# Patient Record
Sex: MALE | Race: WHITE | NOT HISPANIC OR LATINO | Employment: FULL TIME | URBAN - METROPOLITAN AREA
[De-identification: names, ages, dates, MRNs, and addresses within clinical notes are randomized per-mention and may not be internally consistent; named-entity substitution may affect disease eponyms.]

---

## 2022-03-27 ENCOUNTER — APPOINTMENT (OUTPATIENT)
Dept: CT IMAGING | Facility: CLINIC | Age: 37
End: 2022-03-27
Attending: EMERGENCY MEDICINE
Payer: COMMERCIAL

## 2022-03-27 ENCOUNTER — APPOINTMENT (OUTPATIENT)
Dept: ULTRASOUND IMAGING | Facility: CLINIC | Age: 37
End: 2022-03-27
Attending: EMERGENCY MEDICINE
Payer: COMMERCIAL

## 2022-03-27 ENCOUNTER — HOSPITAL ENCOUNTER (EMERGENCY)
Facility: CLINIC | Age: 37
Discharge: HOME | End: 2022-03-27
Attending: EMERGENCY MEDICINE | Admitting: EMERGENCY MEDICINE
Payer: COMMERCIAL

## 2022-03-27 VITALS
HEART RATE: 91 BPM | SYSTOLIC BLOOD PRESSURE: 126 MMHG | TEMPERATURE: 98.2 F | HEIGHT: 73 IN | RESPIRATION RATE: 18 BRPM | BODY MASS INDEX: 25.18 KG/M2 | OXYGEN SATURATION: 97 % | WEIGHT: 190 LBS | DIASTOLIC BLOOD PRESSURE: 75 MMHG

## 2022-03-27 DIAGNOSIS — R11.2 NON-INTRACTABLE VOMITING WITH NAUSEA, UNSPECIFIED VOMITING TYPE: ICD-10-CM

## 2022-03-27 DIAGNOSIS — R10.11 RUQ ABDOMINAL PAIN: ICD-10-CM

## 2022-03-27 LAB
ALBUMIN SERPL-MCNC: 4.4 G/DL (ref 3.4–5)
ALP SERPL-CCNC: 79 U/L (ref 40–150)
ALT SERPL W P-5'-P-CCNC: 53 U/L (ref 0–70)
ANION GAP SERPL CALCULATED.3IONS-SCNC: 6 MMOL/L (ref 3–14)
AST SERPL W P-5'-P-CCNC: 33 U/L (ref 0–45)
BASOPHILS # BLD AUTO: 0 10E3/UL (ref 0–0.2)
BASOPHILS NFR BLD AUTO: 0 %
BILIRUB SERPL-MCNC: 1.3 MG/DL (ref 0.2–1.3)
BUN SERPL-MCNC: 22 MG/DL (ref 7–30)
CALCIUM SERPL-MCNC: 9.1 MG/DL (ref 8.5–10.1)
CHLORIDE BLD-SCNC: 105 MMOL/L (ref 94–109)
CO2 SERPL-SCNC: 28 MMOL/L (ref 20–32)
CREAT SERPL-MCNC: 1.08 MG/DL (ref 0.66–1.25)
EOSINOPHIL # BLD AUTO: 0 10E3/UL (ref 0–0.7)
EOSINOPHIL NFR BLD AUTO: 0 %
ERYTHROCYTE [DISTWIDTH] IN BLOOD BY AUTOMATED COUNT: 11.8 % (ref 10–15)
GFR SERPL CREATININE-BSD FRML MDRD: >90 ML/MIN/1.73M2
GLUCOSE BLD-MCNC: 132 MG/DL (ref 70–99)
HCT VFR BLD AUTO: 46.9 % (ref 40–53)
HGB BLD-MCNC: 16.7 G/DL (ref 13.3–17.7)
IMM GRANULOCYTES # BLD: 0.1 10E3/UL
IMM GRANULOCYTES NFR BLD: 1 %
LIPASE SERPL-CCNC: 93 U/L (ref 73–393)
LYMPHOCYTES # BLD AUTO: 0.5 10E3/UL (ref 0.8–5.3)
LYMPHOCYTES NFR BLD AUTO: 4 %
MCH RBC QN AUTO: 31.6 PG (ref 26.5–33)
MCHC RBC AUTO-ENTMCNC: 35.6 G/DL (ref 31.5–36.5)
MCV RBC AUTO: 89 FL (ref 78–100)
MONOCYTES # BLD AUTO: 0.6 10E3/UL (ref 0–1.3)
MONOCYTES NFR BLD AUTO: 5 %
NEUTROPHILS # BLD AUTO: 10.5 10E3/UL (ref 1.6–8.3)
NEUTROPHILS NFR BLD AUTO: 90 %
NRBC # BLD AUTO: 0 10E3/UL
NRBC BLD AUTO-RTO: 0 /100
PLATELET # BLD AUTO: 166 10E3/UL (ref 150–450)
POTASSIUM BLD-SCNC: 3.4 MMOL/L (ref 3.4–5.3)
PROT SERPL-MCNC: 7.8 G/DL (ref 6.8–8.8)
RBC # BLD AUTO: 5.29 10E6/UL (ref 4.4–5.9)
SODIUM SERPL-SCNC: 139 MMOL/L (ref 133–144)
WBC # BLD AUTO: 11.7 10E3/UL (ref 4–11)

## 2022-03-27 PROCEDURE — 85025 COMPLETE CBC W/AUTO DIFF WBC: CPT | Performed by: EMERGENCY MEDICINE

## 2022-03-27 PROCEDURE — 250N000009 HC RX 250: Performed by: EMERGENCY MEDICINE

## 2022-03-27 PROCEDURE — 76705 ECHO EXAM OF ABDOMEN: CPT

## 2022-03-27 PROCEDURE — 96376 TX/PRO/DX INJ SAME DRUG ADON: CPT

## 2022-03-27 PROCEDURE — 99285 EMERGENCY DEPT VISIT HI MDM: CPT | Mod: 25

## 2022-03-27 PROCEDURE — 80053 COMPREHEN METABOLIC PANEL: CPT | Performed by: EMERGENCY MEDICINE

## 2022-03-27 PROCEDURE — 96375 TX/PRO/DX INJ NEW DRUG ADDON: CPT

## 2022-03-27 PROCEDURE — 96374 THER/PROPH/DIAG INJ IV PUSH: CPT | Mod: 59

## 2022-03-27 PROCEDURE — 83690 ASSAY OF LIPASE: CPT | Performed by: EMERGENCY MEDICINE

## 2022-03-27 PROCEDURE — 36415 COLL VENOUS BLD VENIPUNCTURE: CPT | Performed by: EMERGENCY MEDICINE

## 2022-03-27 PROCEDURE — 74177 CT ABD & PELVIS W/CONTRAST: CPT

## 2022-03-27 PROCEDURE — 250N000011 HC RX IP 250 OP 636: Performed by: EMERGENCY MEDICINE

## 2022-03-27 RX ORDER — TADALAFIL 5 MG/1
TABLET ORAL
COMMUNITY
Start: 2021-07-08

## 2022-03-27 RX ORDER — MORPHINE SULFATE 4 MG/ML
4 INJECTION, SOLUTION INTRAMUSCULAR; INTRAVENOUS
Status: COMPLETED | OUTPATIENT
Start: 2022-03-27 | End: 2022-03-27

## 2022-03-27 RX ORDER — IOPAMIDOL 755 MG/ML
95 INJECTION, SOLUTION INTRAVASCULAR ONCE
Status: COMPLETED | OUTPATIENT
Start: 2022-03-27 | End: 2022-03-27

## 2022-03-27 RX ORDER — ONDANSETRON 2 MG/ML
4 INJECTION INTRAMUSCULAR; INTRAVENOUS ONCE
Status: COMPLETED | OUTPATIENT
Start: 2022-03-27 | End: 2022-03-27

## 2022-03-27 RX ORDER — ONDANSETRON 2 MG/ML
4 INJECTION INTRAMUSCULAR; INTRAVENOUS
Status: COMPLETED | OUTPATIENT
Start: 2022-03-27 | End: 2022-03-27

## 2022-03-27 RX ADMIN — ONDANSETRON 4 MG: 2 INJECTION INTRAMUSCULAR; INTRAVENOUS at 19:58

## 2022-03-27 RX ADMIN — SODIUM CHLORIDE 67 ML: 900 INJECTION INTRAVENOUS at 21:56

## 2022-03-27 RX ADMIN — ONDANSETRON 4 MG: 2 INJECTION INTRAMUSCULAR; INTRAVENOUS at 22:50

## 2022-03-27 RX ADMIN — IOPAMIDOL 95 ML: 755 INJECTION, SOLUTION INTRAVENOUS at 21:56

## 2022-03-27 RX ADMIN — MORPHINE SULFATE 4 MG: 4 INJECTION, SOLUTION INTRAMUSCULAR; INTRAVENOUS at 19:59

## 2022-03-27 ASSESSMENT — ENCOUNTER SYMPTOMS
SHORTNESS OF BREATH: 0
FREQUENCY: 0
COUGH: 0
HEMATURIA: 0
FATIGUE: 1
VOMITING: 1
CONSTIPATION: 0
BACK PAIN: 0
DIARRHEA: 0
DYSURIA: 0
NAUSEA: 1

## 2022-03-28 NOTE — ED PROVIDER NOTES
History   Chief Complaint:  Abdominal Pain and Nausea & Vomiting       HPI   Deyvi Hernandez is a 36 year old male, otherwise healthy, who presents with abdominal pain, nausea, and vomiting. When the patient initially woke up this morning, he was feeling at his baseline. However, around noon he noted that he started to feel nauseous and some slight right sided abdominal discomfort. At first, he thought he was just hungry so he ate his leftover Ho for lunch which he felt may have exacerbated his symptoms. Around 1300 he began to feel increasingly sick and then began vomiting around 1530. Since then, he has had five episodes of vomiting but denies any blood in the vomit. His abdominal pain is intermittent and he is unsure what exacerbates or alleviates his symptoms. While in the ED, he is feeling rather fatigued and his abdominal pain is a 7/10. He also notes that he has had decreased urine today but notes he has not been hydrating well today. He denies any recent dysuria, hematuria, and urinary frequency and urgency. He also denies any shortness of breath, cough, chest pain, constipation, diarrhea, and back pain.       Review of Systems   Constitutional: Positive for fatigue.   Respiratory: Negative for cough and shortness of breath.    Cardiovascular: Negative for chest pain.   Gastrointestinal: Positive for nausea and vomiting. Negative for constipation and diarrhea.   Genitourinary: Positive for decreased urine volume. Negative for dysuria, frequency, hematuria and urgency.   Musculoskeletal: Negative for back pain.   All other systems reviewed and are negative.    Allergies:  Penicillins    Medications:  Cialis    Past Medical History:     ADD  Lumbago  Lipoma of skin       Past Surgical History:    Uriah teeth extraction     Family History:    Mother: low back pain     Social History:  The patient presents to the ED alone. He went to [place] about six weeks ago.       Physical Exam     Patient Vitals for  "the past 24 hrs:   BP Temp Temp src Pulse Resp SpO2 Height Weight   03/27/22 2250 126/75 -- -- 91 18 97 % -- --   03/27/22 2100 -- -- -- -- -- 94 % -- --   03/27/22 2000 -- -- -- -- -- 100 % -- --   03/27/22 1923 136/72 98.2  F (36.8  C) Temporal 95 16 98 % 1.854 m (6' 1\") 86.2 kg (190 lb)     Physical Exam  SKIN:  Warm, dry.  No jaundice.  No right flank erythema or ecchymoses.  HEMATOLOGIC/IMMUNOLOGIC/LYMPHATIC:  No pallor.  EYES:  Conjunctivae normal.  Anicteric.  CARDIOVASCULAR:  Regular rate and rhythm.  No murmur.  RESPIRATORY:  No respiratory distress, breath sounds equal and normal.  GASTROINTESTINAL:  Soft tender abdomen at the right upper quadrant with active bowel sounds.  No distention.  No palpable mass.  No hepatomegaly.  MUSCULOSKELETAL: Normal body habitus.  NEUROLOGIC:  Alert, conversant.  PSYCHIATRIC:  Normal mood.    Emergency Department Course     Imaging:  CT Abdomen Pelvis w Contrast   Final Result   IMPRESSION:    1.  Mild splenomegaly.   2.  Otherwise normal study.       US Abdomen Limited   Final Result   IMPRESSION:   1.  Normal limited abdominal ultrasound.              Report per radiology    Laboratory:  Labs Ordered and Resulted from Time of ED Arrival to Time of ED Departure   COMPREHENSIVE METABOLIC PANEL - Abnormal       Result Value    Sodium 139      Potassium 3.4      Chloride 105      Carbon Dioxide (CO2) 28      Anion Gap 6      Urea Nitrogen 22      Creatinine 1.08      Calcium 9.1      Glucose 132 (*)     Alkaline Phosphatase 79      AST 33      ALT 53      Protein Total 7.8      Albumin 4.4      Bilirubin Total 1.3      GFR Estimate >90     CBC WITH PLATELETS AND DIFFERENTIAL - Abnormal    WBC Count 11.7 (*)     RBC Count 5.29      Hemoglobin 16.7      Hematocrit 46.9      MCV 89      MCH 31.6      MCHC 35.6      RDW 11.8      Platelet Count 166      % Neutrophils 90      % Lymphocytes 4      % Monocytes 5      % Eosinophils 0      % Basophils 0      % Immature Granulocytes 1 "      NRBCs per 100 WBC 0      Absolute Neutrophils 10.5 (*)     Absolute Lymphocytes 0.5 (*)     Absolute Monocytes 0.6      Absolute Eosinophils 0.0      Absolute Basophils 0.0      Absolute Immature Granulocytes 0.1      Absolute NRBCs 0.0     LIPASE - Normal    Lipase 93          Emergency Department Course:             Reviewed:  I reviewed nursing notes, vitals and past medical history    Assessments:  1945 I obtained history and examined the patient as noted above.   2104 I rechecked the patient and explained findings.    I spoke with the patient to discuss his CT results and his treatment plan.     Interventions:  Medications   morphine (PF) injection 4 mg (4 mg Intravenous Given 3/27/22 1959)   ondansetron (ZOFRAN) injection 4 mg (4 mg Intravenous Given 3/27/22 1958)   Saline (67 mLs As instructed Given 3/27/22 2156)   iopamidol (ISOVUE-370) solution 95 mL (95 mLs Intravenous Given 3/27/22 2156)   ondansetron (ZOFRAN) injection 4 mg (4 mg Intravenous Given 3/27/22 2250)       Disposition:  The patient was signed out at shift change to my colleague Dr. Kline.  Anticipate discharge home after second antiemetic dose.    Impression & Plan     CMS Diagnoses: None     Medical Decision Making:  This patient presents concerned about repetitive episodes of vomiting in addition to abdominal pain focused at the right upper quadrant.  Initial exam revealed a palpably tender right upper quadrant which raise concern for biliary tract disease.  Hence evaluation performed as above which was negative for gallstones.  Lab work reassuring.  Slight leukocytosis.  Reexamination revealed tenderness at the mid abdomen which prompted further concern regarding appendicitis.  Hence further imaging performed which gladly was negative.  In the meantime he improved with treatment after morphine and Zofran administration.  The patient still feels nauseated.  So an additional intravenous dose of Zofran will be administered.  Anticipate  the patient will be discharged.  At shift change I signed the patient out to my colleague for further management as needed.  The patient states he has additional ODT Zofran at home to take.    Diagnosis:    ICD-10-CM    1. Non-intractable vomiting with nausea, unspecified vomiting type  R11.2    2. RUQ abdominal pain  R10.11        Discharge Medications:  New Prescriptions    No medications on file       Scribe Disclosure:  I, Yaritza Ramsey, am serving as a scribe at 7:45 PM on 3/27/2022 to document services personally performed by Bo Barajas MD based on my observations and the provider's statements to me.              Bo Barajas MD  03/27/22 7062

## 2022-03-28 NOTE — ED TRIAGE NOTES
[facility]  ED Arrival Note    Pt states that he woke up feeling unwell. C/o R sided abdominal pain since AM and then nausea and multiple episodes of vomiting that started in the afternoon. Pt states that he attempted PO Zofran from a previous script and Tums without any relief, prompting this visit.    Visitors during triage: Mother    Ambulatory: Yes    Directed to: Main ED

## 2024-09-17 ENCOUNTER — OFFICE VISIT (OUTPATIENT)
Dept: URGENT CARE | Facility: URGENT CARE | Age: 39
End: 2024-09-17
Payer: COMMERCIAL

## 2024-09-17 VITALS
OXYGEN SATURATION: 98 % | RESPIRATION RATE: 18 BRPM | DIASTOLIC BLOOD PRESSURE: 85 MMHG | SYSTOLIC BLOOD PRESSURE: 125 MMHG | HEART RATE: 74 BPM | TEMPERATURE: 97.1 F

## 2024-09-17 DIAGNOSIS — J03.90 EXUDATIVE TONSILLITIS: Primary | ICD-10-CM

## 2024-09-17 DIAGNOSIS — R07.0 THROAT PAIN: ICD-10-CM

## 2024-09-17 DIAGNOSIS — H92.01 EAR PAIN, REFERRED, RIGHT: ICD-10-CM

## 2024-09-17 LAB — DEPRECATED S PYO AG THROAT QL EIA: NEGATIVE

## 2024-09-17 PROCEDURE — 87651 STREP A DNA AMP PROBE: CPT | Performed by: PHYSICIAN ASSISTANT

## 2024-09-17 PROCEDURE — 99203 OFFICE O/P NEW LOW 30 MIN: CPT | Performed by: PHYSICIAN ASSISTANT

## 2024-09-17 RX ORDER — CEFDINIR 300 MG/1
300 CAPSULE ORAL 2 TIMES DAILY
Qty: 20 CAPSULE | Refills: 0 | Status: SHIPPED | OUTPATIENT
Start: 2024-09-17 | End: 2024-09-27

## 2024-09-17 RX ORDER — CYCLOBENZAPRINE HCL 10 MG
1 TABLET ORAL 3 TIMES DAILY PRN
COMMUNITY
Start: 2024-08-09

## 2024-09-17 NOTE — PROGRESS NOTES
"Patient presents with:  Otalgia: Patient here with complaint of right ear pain. States there is an occasional \"shooting\" pain but overall constant pain. Started last night. Hurts in the ear to swallow.     (J03.90) Exudative tonsillitis  (primary encounter diagnosis)  Comment:   Plan: cefdinir (OMNICEF) 300 MG capsule          The rapid strep test is negative today, culture is pending.  You may call for the results if you are curious, at this point I am treating you based on your symptoms and physical exam today.    Complete the antibiotic as prescribed.    Salt water gargles as needed.    Tylenol or ibuprofen as needed for pain.    Replace toothbrush in 48 hours.    If the strep culture is positive, it is considered contagious for the first 24 hours of the antibiotic.    (R07.0) Throat pain  Comment:   Plan: Streptococcus A Rapid Screen w/Reflex to PCR -         Clinic Collect, Group A Streptococcus PCR         Throat Swab            (H92.01) Ear pain, referred, right  Comment: Secondary to throat infection  Plan: Take antibiotic as prescribed above.  Ibuprofen as needed for pain.      At the end of the encounter, I discussed results, diagnosis, medications. Discussed red flags for immediate return to clinic/ER, as well as indications for follow up if no improvement. Patient understood and agreed to plan. Patient was stable for discharge     If not improving or if condition worsens, follow up with your Primary Care Provider    If family members develop symptoms such as headache, stomachache, fever, vomiting, or throat pain, consider strep testing.      SUBJECTIVE:   Deyvi Hernandez is a 38 year old male who presents today with right ear pain since last night.  Complains of pain in the ear with swallowing, and pain on the right side of his throat with swallowing.  Denies any fevers.  Denies any runny nose or congestion.  Denies any ear drainage or trauma.  Does have 2 small children at home who recently started " school.  At this time they are asymptomatic.        Patient Active Problem List   Diagnosis    Ingrowing nail    Lumbago    Attention deficit disorder    CARDIOVASCULAR SCREENING; LDL GOAL LESS THAN 160    Lipoma of skin and subcutaneous tissue         No past medical history on file.      Current Outpatient Medications   Medication Sig Dispense Refill    Multiple Vitamins-Iron (DAILY-ALLYSON/IRON/BETA-CAROTENE) TABS TAKE 1 TABLET BY MOUTH DAILY. (Patient not taking: Reported on 10/19/2020) 30 tablet 7     Social History     Tobacco Use    Smoking status: Never Smoker    Smokeless tobacco: Never Used   Substance Use Topics    Alcohol use: Not on file     Family History   Problem Relation Age of Onset    Diabetes Mother     Diabetes Father          ROS:    10 point ROS of systems including Constitutional, Eyes, Respiratory, Cardiovascular, Gastroenterology, Genitourinary, Integumentary, Muscularskeletal, Psychiatric ,neurological were all negative except for pertinent positives noted in my HPI       OBJECTIVE:  /85 (BP Location: Left arm, Patient Position: Sitting, Cuff Size: Adult Regular)   Pulse 74   Temp 97.1  F (36.2  C) (Tympanic)   Resp 18   SpO2 98%   Physical Exam:  GENERAL APPEARANCE: healthy, alert and no distress  EYES: EOMI,  PERRL, conjunctiva clear  HENT: ear canals and TM's normal.  Nose without erythema or coryza.  OP: Erythematous tonsils, with exudate on right tonsil   NECK: supple, with tender right anterior cervical lymphadenopathy lymphadenopathy  RESP: lungs clear to auscultation - no rales, rhonchi or wheezes  CV: regular rates and rhythm, normal S1 S2, no murmur noted  NEURO: Normal strength and tone, sensory exam grossly normal,  normal speech and mentation  SKIN: no suspicious lesions or rashes    Results for orders placed or performed in visit on 09/17/24   Streptococcus A Rapid Screen w/Reflex to PCR - Clinic Collect     Status: Normal    Specimen: Throat; Swab   Result Value Ref  Range    Group A Strep antigen Negative Negative

## 2024-09-18 LAB — GROUP A STREP BY PCR: NOT DETECTED

## 2024-12-21 ENCOUNTER — HEALTH MAINTENANCE LETTER (OUTPATIENT)
Age: 39
End: 2024-12-21